# Patient Record
Sex: FEMALE | URBAN - METROPOLITAN AREA
[De-identification: names, ages, dates, MRNs, and addresses within clinical notes are randomized per-mention and may not be internally consistent; named-entity substitution may affect disease eponyms.]

---

## 2024-04-10 ENCOUNTER — APPOINTMENT (RX ONLY)
Dept: URBAN - METROPOLITAN AREA CLINIC 134 | Facility: CLINIC | Age: 69
Setting detail: DERMATOLOGY
End: 2024-04-10

## 2024-04-10 DIAGNOSIS — R21 RASH AND OTHER NONSPECIFIC SKIN ERUPTION: ICD-10-CM | Status: INADEQUATELY CONTROLLED

## 2024-04-10 PROCEDURE — ? COUNSELING

## 2024-04-10 PROCEDURE — 99203 OFFICE O/P NEW LOW 30 MIN: CPT

## 2024-04-10 PROCEDURE — ? DIAGNOSIS COMMENT

## 2024-04-10 PROCEDURE — ? PRESCRIPTION

## 2024-04-10 PROCEDURE — ? PRESCRIPTION MEDICATION MANAGEMENT

## 2024-04-10 RX ORDER — CLOBETASOL PROPIONATE 0.5 MG/G
APPLY CREAM TOPICAL BID
Qty: 120 | Refills: 3 | Status: ERX | COMMUNITY
Start: 2024-04-10

## 2024-04-10 RX ORDER — HYDROCORTISONE 25 MG/G
APPLY CREAM TOPICAL BID
Qty: 30 | Refills: 1 | Status: ERX | COMMUNITY
Start: 2024-04-10

## 2024-04-10 RX ORDER — HYDROXYZINE HYDROCHLORIDE 10 MG/1
1-2 TABLET, FILM COATED ORAL AS DIRECTED
Qty: 30 | Refills: 1 | Status: ERX | COMMUNITY
Start: 2024-04-10

## 2024-04-10 RX ADMIN — HYDROCORTISONE APPLY: 25 CREAM TOPICAL at 00:00

## 2024-04-10 RX ADMIN — CLOBETASOL PROPIONATE APPLY: 0.5 CREAM TOPICAL at 00:00

## 2024-04-10 RX ADMIN — HYDROXYZINE HYDROCHLORIDE 1-2: 10 TABLET, FILM COATED ORAL at 00:00

## 2024-04-10 ASSESSMENT — LOCATION SIMPLE DESCRIPTION DERM
LOCATION SIMPLE: RIGHT LOWER BACK
LOCATION SIMPLE: LEFT FOREARM
LOCATION SIMPLE: RIGHT THIGH
LOCATION SIMPLE: LEFT THIGH
LOCATION SIMPLE: RIGHT FOREHEAD

## 2024-04-10 ASSESSMENT — LOCATION ZONE DERM
LOCATION ZONE: LEG
LOCATION ZONE: FACE
LOCATION ZONE: TRUNK
LOCATION ZONE: ARM

## 2024-04-10 ASSESSMENT — LOCATION DETAILED DESCRIPTION DERM
LOCATION DETAILED: RIGHT ANTERIOR PROXIMAL THIGH
LOCATION DETAILED: LEFT ANTERIOR PROXIMAL THIGH
LOCATION DETAILED: RIGHT SUPERIOR LATERAL MIDBACK
LOCATION DETAILED: LEFT PROXIMAL DORSAL FOREARM
LOCATION DETAILED: RIGHT MEDIAL FOREHEAD

## 2024-04-10 NOTE — HPI: RASH
What Type Of Note Output Would You Prefer (Optional)?: Standard Output
How Severe Is Your Rash?: moderate
Is This A New Presentation, Or A Follow-Up?: Rash
Additional History: Patient was seen by pcp who prescribed a shampoo and fluconazole symptoms not improving with medication. PCP performed biopsy; interface dermatitis with differential of autoimmune vs drug reaction pathologist Dr. Deleon.

## 2024-04-10 NOTE — PROCEDURE: PRESCRIPTION MEDICATION MANAGEMENT
Discontinue Regimen: ketoconazole shampoo\\dominga unnecessary supplements and vitamins x 4 weeks, may add back in 1 week at a time once clear + 4 weeks
Detail Level: Zone
Render In Strict Bullet Format?: No
Initiate Treatment: hydrocortisone 2.5 % topical cream Apply to rash on face twice daily for up to 2 weeks, then stop x 1 week. Repeat as needed for flare.\\nclobetasol 0.05 % topical cream Apply to rash on trunk and extremities twice daily for up to 2 weeks, then stop x 1 week. Repeat as needed for flare.\\nhydroxyzine HCl 10 mg tablet Take 1-2 tabs nightly as needed for rash/itch
Plan: Per path results differential includes autoimmune vs drug reaction.  Patient denies joint pains and history of shawl-like or facial rashes.\\nPCP is working patient up for autoimmune cause; labs are pending.\\nFeet and toenails examined and no obvious signs of fungal infection.\\nDiscussed levothyroxine has common adverse reaction of rash.  If negative labs and no improvement after discontinuing all supplements and otc pain relievers, consider alternative medication to levothyroxine. \\nPatient did finish 21 day prednisone taper with PCP and refuses to take prednisone again.  She did not see improvement and states she is still having GI upset from this.  \\nFollow up in 6-8 weeks if no improvement or worsening.

## 2024-04-10 NOTE — PROCEDURE: DIAGNOSIS COMMENT
Render Risk Assessment In Note?: no
Comment: Biopsy proven interface dermatitis with differential of autoimmune dermatitis (lupus, dermatomyositis) vs drug reaction per pathologist Dr. Deleon.  Biopsied by PCP.
Detail Level: Simple

## 2024-05-22 ENCOUNTER — APPOINTMENT (RX ONLY)
Dept: URBAN - METROPOLITAN AREA CLINIC 134 | Facility: CLINIC | Age: 69
Setting detail: DERMATOLOGY
End: 2024-05-22

## 2024-05-22 DIAGNOSIS — R21 RASH AND OTHER NONSPECIFIC SKIN ERUPTION: ICD-10-CM | Status: INADEQUATELY CONTROLLED

## 2024-05-22 PROCEDURE — ? COUNSELING

## 2024-05-22 PROCEDURE — ? PRESCRIPTION

## 2024-05-22 PROCEDURE — 11105 PUNCH BX SKIN EA SEP/ADDL: CPT

## 2024-05-22 PROCEDURE — 11104 PUNCH BX SKIN SINGLE LESION: CPT

## 2024-05-22 PROCEDURE — ? PRESCRIPTION MEDICATION MANAGEMENT

## 2024-05-22 PROCEDURE — ? BIOPSY BY PUNCH METHOD

## 2024-05-22 PROCEDURE — ? PHOTO-DOCUMENTATION

## 2024-05-22 RX ORDER — HYDROXYZINE HYDROCHLORIDE 10 MG/1
1-2 TABLET, FILM COATED ORAL AS DIRECTED
Qty: 30 | Refills: 1 | Status: ERX | COMMUNITY
Start: 2024-05-22

## 2024-05-22 RX ORDER — TRIAMCINOLONE ACETONIDE 0.25 MG/G
OINTMENT TOPICAL
Qty: 80 | Refills: 2 | Status: ERX

## 2024-05-22 RX ADMIN — HYDROXYZINE HYDROCHLORIDE 1-2: 10 TABLET, FILM COATED ORAL at 00:00

## 2024-05-22 ASSESSMENT — LOCATION ZONE DERM
LOCATION ZONE: LEG
LOCATION ZONE: FACE
LOCATION ZONE: TRUNK
LOCATION ZONE: ARM

## 2024-05-22 ASSESSMENT — LOCATION DETAILED DESCRIPTION DERM
LOCATION DETAILED: LEFT ANTERIOR PROXIMAL THIGH
LOCATION DETAILED: RIGHT ANTERIOR PROXIMAL THIGH
LOCATION DETAILED: LEFT PROXIMAL DORSAL FOREARM
LOCATION DETAILED: LEFT POSTERIOR SHOULDER
LOCATION DETAILED: RIGHT LATERAL PROXIMAL UPPER ARM
LOCATION DETAILED: RIGHT SUPERIOR LATERAL MIDBACK
LOCATION DETAILED: RIGHT MEDIAL FOREHEAD

## 2024-05-22 ASSESSMENT — LOCATION SIMPLE DESCRIPTION DERM
LOCATION SIMPLE: LEFT FOREARM
LOCATION SIMPLE: RIGHT UPPER ARM
LOCATION SIMPLE: RIGHT LOWER BACK
LOCATION SIMPLE: LEFT SHOULDER
LOCATION SIMPLE: RIGHT FOREHEAD
LOCATION SIMPLE: LEFT THIGH
LOCATION SIMPLE: RIGHT THIGH

## 2024-05-22 NOTE — PROCEDURE: BIOPSY BY PUNCH METHOD
Detail Level: Detailed
Was A Bandage Applied: Yes
Punch Size In Mm: 3
Size Of Lesion In Cm (Optional): 0
Depth Of Punch Biopsy: dermis
Biopsy Type: H and E
Anesthesia Type: 1% lidocaine without epinephrine
Anesthesia Volume In Cc: 0.5
Hemostasis: None
Epidermal Sutures: 4-0 Prolene
Number Of Epidermal Sutures (Optional): 2
Wound Care: Petrolatum
Dressing: bandage
Suture Removal: 14 days
Patient Will Remove Sutures At Home?: No
Lab: [id number]
Consent: Written consent was obtained and risks were reviewed including but not limited to scarring, infection, bleeding, scabbing, incomplete removal, nerve damage and allergy to anesthesia.
Post-Care Instructions: I reviewed with the patient in detail post-care instructions. Patient is to keep the biopsy site dry overnight, and then apply bacitracin twice daily until healed. Patient may apply hydrogen peroxide soaks to remove any crusting.
Home Suture Removal Text: Patient was provided a home suture removal kit and will remove their sutures at home.  If they have any questions or difficulties they will call the office.
Notification Instructions: Patient will be notified of biopsy results. However, patient instructed to call the office if not contacted within 2 weeks.
Billing Type: Third-Party Bill
Information: Selecting Yes will display possible errors in your note based on the variables you have selected. This validation is only offered as a suggestion for you. PLEASE NOTE THAT THE VALIDATION TEXT WILL BE REMOVED WHEN YOU FINALIZE YOUR NOTE. IF YOU WANT TO FAX A PRELIMINARY NOTE YOU WILL NEED TO TOGGLE THIS TO 'NO' IF YOU DO NOT WANT IT IN YOUR FAXED NOTE.

## 2024-05-22 NOTE — PROCEDURE: PRESCRIPTION MEDICATION MANAGEMENT
Discontinue Regimen: hydrocortisone 2.5 % topical cream - burning on application
Continue Regimen: hydroxyzine HCl 10 mg tablet Take 1-2 tabs nightly as needed for rash/itch\\n\\nclobetasol 0.05 % topical cream Apply to rash on trunk and extremities twice daily for up to 2 weeks, then stop x 1 week. Repeat as needed for flare.  avoid biopsy sites.
Detail Level: Zone
Render In Strict Bullet Format?: No
Initiate Treatment: triamcinolone acetonide 0.025 % topical ointment \\nApply to rash on face twice daily for up to 2 weeks for flare, then stop 1 week and repeat as needed.
Plan: Now with facial rash, shawl-like rash, and papules on dorsal hands overlying knuckles \\nPCP ordered labs to rule out autoimmune cause; labs negative per patient. Results have been requested from PCP. \\nFollow up in 2 weeks suture removal and review results

## 2024-06-03 ENCOUNTER — APPOINTMENT (RX ONLY)
Dept: URBAN - METROPOLITAN AREA CLINIC 134 | Facility: CLINIC | Age: 69
Setting detail: DERMATOLOGY
End: 2024-06-03

## 2024-06-03 DIAGNOSIS — L30.8 OTHER SPECIFIED DERMATITIS: ICD-10-CM

## 2024-06-03 PROCEDURE — ? COUNSELING

## 2024-06-03 PROCEDURE — ? ADDITIONAL NOTES

## 2024-06-03 PROCEDURE — ? REFERRAL

## 2024-06-03 PROCEDURE — ? DIAGNOSIS COMMENT

## 2024-06-03 PROCEDURE — ? PRESCRIPTION

## 2024-06-03 RX ORDER — HYDROXYCHLOROQUINE SULFATE 200 MG/1
TABLET, FILM COATED ORAL
Qty: 120 | Refills: 0 | Status: ERX | COMMUNITY
Start: 2024-06-03

## 2024-06-03 RX ORDER — TACROLIMUS 1 MG/G
OINTMENT TOPICAL
Qty: 100 | Refills: 3 | Status: ERX

## 2024-06-03 RX ADMIN — HYDROXYCHLOROQUINE SULFATE 1: 200 TABLET, FILM COATED ORAL at 00:00

## 2024-06-03 NOTE — PROCEDURE: ADDITIONAL NOTES
Render Risk Assessment In Note?: no
Additional Notes: Reviewed pathology results with patient via phone favoring connective tissue disease despite negative CLIFF with PCP on 5/6/24\\nRefer to [facility], Dr. Dav Hogue\\nInitiate hydroxychloroquine 400 mg BID and tacrolimus ointment for topical steroid-free days.\\nDiscussed risk of neuropathy and peripheral vision changes with oral hydroxychloroquine.\\nRefuses oral prednisone.
Detail Level: Detailed

## 2024-06-03 NOTE — PROCEDURE: DIAGNOSIS COMMENT
Comment: most consistent with connective tissue disease
Render Risk Assessment In Note?: no
Detail Level: Simple

## 2024-06-05 ENCOUNTER — APPOINTMENT (RX ONLY)
Dept: URBAN - METROPOLITAN AREA CLINIC 134 | Facility: CLINIC | Age: 69
Setting detail: DERMATOLOGY
End: 2024-06-05

## 2024-06-05 DIAGNOSIS — Z48.02 ENCOUNTER FOR REMOVAL OF SUTURES: ICD-10-CM

## 2024-06-05 PROCEDURE — ? SUTURE REMOVAL (NO GLOBAL PERIOD)

## 2024-06-05 ASSESSMENT — LOCATION DETAILED DESCRIPTION DERM
LOCATION DETAILED: RIGHT ANTERIOR LATERAL PROXIMAL UPPER ARM
LOCATION DETAILED: LEFT POSTERIOR SHOULDER

## 2024-06-05 ASSESSMENT — LOCATION SIMPLE DESCRIPTION DERM
LOCATION SIMPLE: LEFT SHOULDER
LOCATION SIMPLE: RIGHT UPPER ARM

## 2024-06-05 ASSESSMENT — LOCATION ZONE DERM: LOCATION ZONE: ARM

## 2024-06-05 NOTE — PROCEDURE: SUTURE REMOVAL (NO GLOBAL PERIOD)
Detail Level: Detailed
Add 1585x Cpt? (Do Not Bill If You Billed For The Procedure Placing The Sutures. This Is An Add-On Code That Must Be Billed With An E/M Visit Code): No
Suture Removal Completed By (Optional): Geremias Duarte MA

## 2024-06-13 ENCOUNTER — RX ONLY (OUTPATIENT)
Age: 69
Setting detail: RX ONLY
End: 2024-06-13

## 2024-06-13 RX ORDER — TACROLIMUS 1 MG/G
OINTMENT TOPICAL
Qty: 100 | Refills: 3 | Status: ERX | COMMUNITY
Start: 2024-06-13

## 2024-07-31 ENCOUNTER — APPOINTMENT (RX ONLY)
Dept: URBAN - METROPOLITAN AREA CLINIC 134 | Facility: CLINIC | Age: 69
Setting detail: DERMATOLOGY
End: 2024-07-31

## 2024-07-31 VITALS — WEIGHT: 150 LBS

## 2024-07-31 DIAGNOSIS — L93.2 OTHER LOCAL LUPUS ERYTHEMATOSUS: ICD-10-CM

## 2024-07-31 PROCEDURE — ? PRESCRIPTION

## 2024-07-31 PROCEDURE — ? TREATMENT REGIMEN

## 2024-07-31 PROCEDURE — 99213 OFFICE O/P EST LOW 20 MIN: CPT

## 2024-07-31 PROCEDURE — ? COUNSELING

## 2024-07-31 RX ORDER — TACROLIMUS 1 MG/G
OINTMENT TOPICAL
Qty: 100 | Refills: 2 | Status: ERX

## 2024-07-31 ASSESSMENT — LOCATION SIMPLE DESCRIPTION DERM
LOCATION SIMPLE: LEFT UPPER BACK
LOCATION SIMPLE: SCALP
LOCATION SIMPLE: LEFT HAND
LOCATION SIMPLE: LEFT POSTERIOR UPPER ARM
LOCATION SIMPLE: RIGHT POSTERIOR UPPER ARM
LOCATION SIMPLE: RIGHT HAND
LOCATION SIMPLE: CHEST

## 2024-07-31 ASSESSMENT — BSA RASH: BSA RASH: 20

## 2024-07-31 ASSESSMENT — LOCATION DETAILED DESCRIPTION DERM
LOCATION DETAILED: LEFT SUPERIOR MEDIAL UPPER BACK
LOCATION DETAILED: LEFT SUPERIOR PARIETAL SCALP
LOCATION DETAILED: RIGHT ULNAR DORSAL HAND
LOCATION DETAILED: LEFT DISTAL POSTERIOR UPPER ARM
LOCATION DETAILED: MIDDLE STERNUM
LOCATION DETAILED: LEFT ULNAR DORSAL HAND
LOCATION DETAILED: RIGHT DISTAL POSTERIOR UPPER ARM

## 2024-07-31 ASSESSMENT — LOCATION ZONE DERM
LOCATION ZONE: ARM
LOCATION ZONE: TRUNK
LOCATION ZONE: SCALP
LOCATION ZONE: HAND

## 2024-07-31 ASSESSMENT — PAIN INTENSITY VAS: HOW INTENSE IS YOUR PAIN 0 BEING NO PAIN, 10 BEING THE MOST SEVERE PAIN POSSIBLE?: NO PAIN

## 2024-07-31 ASSESSMENT — SEVERITY ASSESSMENT: SEVERITY: MODERATE TO SEVERE

## 2024-07-31 NOTE — PROCEDURE: TREATMENT REGIMEN
Plan: Suggest yearly eye checks if long term use of hydroxychloroquine. Patient to watch out for neuropathy, she states she already has tingling in her hands so she is to monitor for worsening. Discussed that there is a chance it could be a drug induced lupus from the atenolol however patient has been on atenolol since she was 51 years old and this rash has been a new issue as of February 2024. Labs show positive U1RNP and CLIFF.Patient had positive biopsy for lymphocytic colitis previously. Patient reports general fatigue and “feeling not healthy.” Patient displays clear symptoms of systemic lupus as well as possible neuropathy in her fingers.\\n\\nPatient has previously taken prednisone for 25 days and states it did not help and she would like to avoid in the future.  She has increasing hair loss over the last month with increased itch, diarrhea, lack of energy\\n\\nFollow up in 6 weeks
Detail Level: Zone
Continue Regimen: Hydroxyzine 10mg take 1-2 tabs nightly as needed \\nTacrolimus 1% ointment apply to affected areas once to twice daily (refill sent in)
Otc Regimen: Vinegar, water, oatmeal with warm water for itch \\nIce packs for scalp
Discontinue Regimen: Zyrtec
Initiate Treatment: Hydroxychloroquine 200mg take 1 po BID (Rx previously sent by [facility])

## 2024-08-27 ENCOUNTER — APPOINTMENT (RX ONLY)
Dept: URBAN - METROPOLITAN AREA CLINIC 134 | Facility: CLINIC | Age: 69
Setting detail: DERMATOLOGY
End: 2024-08-27

## 2024-08-27 ENCOUNTER — RX ONLY (OUTPATIENT)
Age: 69
Setting detail: RX ONLY
End: 2024-08-27

## 2024-08-27 DIAGNOSIS — L93.2 OTHER LOCAL LUPUS ERYTHEMATOSUS: ICD-10-CM

## 2024-08-27 PROCEDURE — 99213 OFFICE O/P EST LOW 20 MIN: CPT

## 2024-08-27 PROCEDURE — ? ADDITIONAL NOTES

## 2024-08-27 PROCEDURE — ? TREATMENT REGIMEN

## 2024-08-27 PROCEDURE — ? COUNSELING

## 2024-08-27 RX ORDER — HYDROXYZINE HYDROCHLORIDE 10 MG/1
TABLET, FILM COATED ORAL
Qty: 60 | Refills: 1 | Status: ERX

## 2024-08-27 ASSESSMENT — LOCATION SIMPLE DESCRIPTION DERM
LOCATION SIMPLE: LEFT UPPER BACK
LOCATION SIMPLE: ABDOMEN
LOCATION SIMPLE: LEFT POSTERIOR UPPER ARM
LOCATION SIMPLE: RIGHT POSTERIOR UPPER ARM
LOCATION SIMPLE: CHEST

## 2024-08-27 ASSESSMENT — LOCATION DETAILED DESCRIPTION DERM
LOCATION DETAILED: RIGHT DISTAL POSTERIOR UPPER ARM
LOCATION DETAILED: LEFT SUPERIOR MEDIAL UPPER BACK
LOCATION DETAILED: MIDDLE STERNUM
LOCATION DETAILED: LEFT DISTAL POSTERIOR UPPER ARM
LOCATION DETAILED: PERIUMBILICAL SKIN

## 2024-08-27 ASSESSMENT — LOCATION ZONE DERM
LOCATION ZONE: TRUNK
LOCATION ZONE: ARM

## 2024-08-27 NOTE — PROCEDURE: TREATMENT REGIMEN
Plan: Continue to take Hydroxychloquine 1 bid. Monitor original and new rash for next couple weeks and RTC if any worsening, if everything holds still then continue Tx regimen.
Detail Level: Zone
Continue Regimen: Hydroxychloroquine 200mg take 1 po BID (Rx previously sent by [facility])\\nHydroxyzine 10mg take 1-2 tabs nightly as needed \\nTacrolimus 1% ointment apply to affected areas once to twice daily (refill sent in)
Otc Regimen: Vinegar, water, oatmeal with warm water for itch \\nIce packs for scalp
Discontinue Regimen: Zyrtec

## 2024-09-11 ENCOUNTER — APPOINTMENT (RX ONLY)
Dept: URBAN - METROPOLITAN AREA CLINIC 134 | Facility: CLINIC | Age: 69
Setting detail: DERMATOLOGY
End: 2024-09-11

## 2024-09-11 DIAGNOSIS — L93.2 OTHER LOCAL LUPUS ERYTHEMATOSUS: ICD-10-CM | Status: IMPROVED

## 2024-09-11 DIAGNOSIS — L66.1 LICHEN PLANOPILARIS: ICD-10-CM | Status: INADEQUATELY CONTROLLED

## 2024-09-11 DIAGNOSIS — L66.11 CLASSIC LICHEN PLANOPILARIS: ICD-10-CM | Status: INADEQUATELY CONTROLLED

## 2024-09-11 PROCEDURE — ? PRESCRIPTION

## 2024-09-11 PROCEDURE — 99213 OFFICE O/P EST LOW 20 MIN: CPT

## 2024-09-11 PROCEDURE — ? COUNSELING

## 2024-09-11 PROCEDURE — ? TREATMENT REGIMEN

## 2024-09-11 RX ORDER — HYDROXYCHLOROQUINE SULFATE 200 MG/1
1 TABLET, FILM COATED ORAL BID
Qty: 60 | Refills: 3 | Status: ERX

## 2024-09-11 ASSESSMENT — PAIN INTENSITY VAS: HOW INTENSE IS YOUR PAIN 0 BEING NO PAIN, 10 BEING THE MOST SEVERE PAIN POSSIBLE?: NO PAIN

## 2024-09-11 ASSESSMENT — LOCATION SIMPLE DESCRIPTION DERM
LOCATION SIMPLE: LEFT UPPER BACK
LOCATION SIMPLE: RIGHT POSTERIOR UPPER ARM
LOCATION SIMPLE: LEFT POSTERIOR UPPER ARM
LOCATION SIMPLE: RIGHT SCALP
LOCATION SIMPLE: CHEST
LOCATION SIMPLE: ABDOMEN

## 2024-09-11 ASSESSMENT — LOCATION DETAILED DESCRIPTION DERM
LOCATION DETAILED: RIGHT MEDIAL FRONTAL SCALP
LOCATION DETAILED: LEFT DISTAL POSTERIOR UPPER ARM
LOCATION DETAILED: PERIUMBILICAL SKIN
LOCATION DETAILED: RIGHT DISTAL POSTERIOR UPPER ARM
LOCATION DETAILED: LEFT SUPERIOR MEDIAL UPPER BACK
LOCATION DETAILED: MIDDLE STERNUM

## 2024-09-11 ASSESSMENT — LOCATION ZONE DERM
LOCATION ZONE: ARM
LOCATION ZONE: SCALP
LOCATION ZONE: TRUNK

## 2024-09-11 ASSESSMENT — SEVERITY ASSESSMENT: SEVERITY: MILD TO MODERATE

## 2024-09-11 ASSESSMENT — BSA RASH: BSA RASH: 15

## 2024-09-11 NOTE — PROCEDURE: TREATMENT REGIMEN
Continue Regimen: Hydroxychloroquine 200mg tablets twice daily\\nTacrolimus .1% cream applied twice daily to affected areas of body (rx at home)\\nHydroxizine 10mg 1-2 tablets as needed for itch (rx at home)

## 2024-12-18 ENCOUNTER — APPOINTMENT (OUTPATIENT)
Dept: URBAN - METROPOLITAN AREA CLINIC 134 | Facility: CLINIC | Age: 69
Setting detail: DERMATOLOGY
End: 2024-12-18

## 2024-12-18 ENCOUNTER — RX ONLY (RX ONLY)
Age: 69
End: 2024-12-18

## 2024-12-18 DIAGNOSIS — L66.11 CLASSIC LICHEN PLANOPILARIS: ICD-10-CM | Status: RESOLVING

## 2024-12-18 DIAGNOSIS — L93.1 SUBACUTE CUTANEOUS LUPUS ERYTHEMATOSUS: ICD-10-CM | Status: WELL CONTROLLED

## 2024-12-18 PROCEDURE — ? COUNSELING

## 2024-12-18 PROCEDURE — ? TREATMENT REGIMEN

## 2024-12-18 PROCEDURE — 99213 OFFICE O/P EST LOW 20 MIN: CPT

## 2024-12-18 RX ORDER — TACROLIMUS 1 MG/G
OINTMENT TOPICAL
Qty: 100 | Refills: 2 | Status: CANCELLED

## 2024-12-18 ASSESSMENT — LOCATION ZONE DERM
LOCATION ZONE: ARM
LOCATION ZONE: SCALP
LOCATION ZONE: TRUNK

## 2024-12-18 ASSESSMENT — LOCATION SIMPLE DESCRIPTION DERM
LOCATION SIMPLE: LEFT UPPER BACK
LOCATION SIMPLE: CHEST
LOCATION SIMPLE: ABDOMEN
LOCATION SIMPLE: LEFT POSTERIOR UPPER ARM
LOCATION SIMPLE: RIGHT POSTERIOR UPPER ARM
LOCATION SIMPLE: RIGHT SCALP

## 2024-12-18 ASSESSMENT — LOCATION DETAILED DESCRIPTION DERM
LOCATION DETAILED: MIDDLE STERNUM
LOCATION DETAILED: RIGHT MEDIAL FRONTAL SCALP
LOCATION DETAILED: RIGHT DISTAL POSTERIOR UPPER ARM
LOCATION DETAILED: LEFT SUPERIOR MEDIAL UPPER BACK
LOCATION DETAILED: PERIUMBILICAL SKIN
LOCATION DETAILED: LEFT DISTAL POSTERIOR UPPER ARM

## 2024-12-18 NOTE — PROCEDURE: TREATMENT REGIMEN
Continue Regimen: Hydroxychloroquine 200mg tablets twice daily\\nTacrolimus .1% cream applied twice daily to affected areas of body, trying to reduce applications\\nHydroxizine 10mg 1-2 tablets as needed for itch (rx at home)
Detail Level: Zone
Plan: Follow up in 3 months.
Otc Regimen: Cerave Anti-Itch lotion
Continue Regimen: Apply Tacrolimus .12% solution applied to scalp 1-2 times daily as tolerated. And try reducing application time to every other day as tolerated.

## 2024-12-26 RX ORDER — TACROLIMUS 1 MG/G
OINTMENT TOPICAL
Qty: 100 | Refills: 2 | Status: ERX

## 2024-12-26 RX ORDER — HYDROXYZINE HYDROCHLORIDE 10 MG/1
1-2 TABLET, FILM COATED ORAL AS DIRECTED
Qty: 30 | Refills: 1 | Status: ERX

## 2024-12-26 RX ORDER — HYDROXYCHLOROQUINE SULFATE 200 MG/1
1 TABLET, FILM COATED ORAL BID
Qty: 60 | Refills: 3 | Status: ERX

## 2025-06-18 ENCOUNTER — APPOINTMENT (OUTPATIENT)
Dept: URBAN - METROPOLITAN AREA CLINIC 134 | Facility: CLINIC | Age: 70
Setting detail: DERMATOLOGY
End: 2025-06-18

## 2025-06-18 DIAGNOSIS — L93.1 SUBACUTE CUTANEOUS LUPUS ERYTHEMATOSUS: ICD-10-CM | Status: WELL CONTROLLED

## 2025-06-18 DIAGNOSIS — L66.11 CLASSIC LICHEN PLANOPILARIS: ICD-10-CM | Status: IMPROVED

## 2025-06-18 PROCEDURE — ? TREATMENT REGIMEN

## 2025-06-18 PROCEDURE — ? COUNSELING

## 2025-06-18 ASSESSMENT — LOCATION SIMPLE DESCRIPTION DERM
LOCATION SIMPLE: CHEST
LOCATION SIMPLE: LEFT UPPER BACK
LOCATION SIMPLE: ABDOMEN
LOCATION SIMPLE: LEFT POSTERIOR UPPER ARM
LOCATION SIMPLE: RIGHT POSTERIOR UPPER ARM
LOCATION SIMPLE: RIGHT SCALP

## 2025-06-18 ASSESSMENT — PAIN INTENSITY VAS: HOW INTENSE IS YOUR PAIN 0 BEING NO PAIN, 10 BEING THE MOST SEVERE PAIN POSSIBLE?: NO PAIN

## 2025-06-18 ASSESSMENT — LOCATION ZONE DERM
LOCATION ZONE: ARM
LOCATION ZONE: SCALP
LOCATION ZONE: TRUNK

## 2025-06-18 ASSESSMENT — LOCATION DETAILED DESCRIPTION DERM
LOCATION DETAILED: LEFT DISTAL POSTERIOR UPPER ARM
LOCATION DETAILED: RIGHT DISTAL POSTERIOR UPPER ARM
LOCATION DETAILED: PERIUMBILICAL SKIN
LOCATION DETAILED: RIGHT MEDIAL FRONTAL SCALP
LOCATION DETAILED: MIDDLE STERNUM
LOCATION DETAILED: LEFT SUPERIOR MEDIAL UPPER BACK

## 2025-06-18 ASSESSMENT — SEVERITY ASSESSMENT: SEVERITY: ALMOST CLEAR

## 2025-06-18 NOTE — PROCEDURE: TREATMENT REGIMEN
Continue Regimen: Hydroxychloroquine 200mg tablets twice daily\\nTacrolimus .1% cream applied twice daily to affected areas of body\\nHydroxizine 10mg 1-2 tablets as needed for itch (rx at home)
Detail Level: Zone
Plan: Follow up in 3 months.
Otc Regimen: Cerave Anti-Itch lotion
Continue Regimen: Apply Tacrolimus .12% solution applied to scalp 1-2 times daily as tolerated. And try reducing application time to every other day as tolerated. trying to not use for a month (until mid July) the reevaluate
Plan: Try reintroducing hair products one a time

## 2025-06-18 NOTE — PROCEDURE: COUNSELING
Patient Specific Counseling (Will Not Stick From Patient To Patient): Labs from Dr. Sykes review today.
Detail Level: Zone